# Patient Record
Sex: FEMALE | Race: WHITE | Employment: PART TIME | URBAN - METROPOLITAN AREA
[De-identification: names, ages, dates, MRNs, and addresses within clinical notes are randomized per-mention and may not be internally consistent; named-entity substitution may affect disease eponyms.]

---

## 2023-11-05 ENCOUNTER — OFFICE VISIT (OUTPATIENT)
Dept: FAMILY MEDICINE | Facility: CLINIC | Age: 58
End: 2023-11-05
Payer: COMMERCIAL

## 2023-11-05 VITALS
SYSTOLIC BLOOD PRESSURE: 132 MMHG | TEMPERATURE: 98.2 F | DIASTOLIC BLOOD PRESSURE: 80 MMHG | OXYGEN SATURATION: 97 % | RESPIRATION RATE: 16 BRPM | HEIGHT: 65 IN | HEART RATE: 67 BPM | WEIGHT: 125.5 LBS | BODY MASS INDEX: 20.91 KG/M2

## 2023-11-05 DIAGNOSIS — S61.213A LACERATION OF LEFT MIDDLE FINGER WITHOUT FOREIGN BODY WITHOUT DAMAGE TO NAIL, INITIAL ENCOUNTER: Primary | ICD-10-CM

## 2023-11-05 PROCEDURE — 12001 RPR S/N/AX/GEN/TRNK 2.5CM/<: CPT | Performed by: FAMILY MEDICINE

## 2023-11-05 PROCEDURE — 90471 IMMUNIZATION ADMIN: CPT | Performed by: FAMILY MEDICINE

## 2023-11-05 PROCEDURE — 90715 TDAP VACCINE 7 YRS/> IM: CPT | Performed by: FAMILY MEDICINE

## 2023-11-05 NOTE — PROGRESS NOTES
"Clinical Decision Making:    At the end of the encounter, I discussed results, diagnosis, medications. Discussed red flags for immediate return to clinic/ER, as well as indications for follow up if no improvement. Patient understood and agreed to plan. Patient was stable for discharge.      ICD-10-CM    1. Laceration of left middle finger without foreign body without damage to nail, initial encounter  S61.213A Primary Care Referral        Closed with Dermabond  Discussed not soaking, watching for signs of infection including redness, pain or drainage and following up if that occurs  Tetanus updated today  Printed patient education on lacerations closed with adhesive  Follow-up if not improving as anticipated  Placed order to help get her scheduled for primary care to establish care      There are no Patient Instructions on file for this visit.   Return if symptoms worsen or fail to improve.      chief complaint    HPI:  Ira Ortega is a 58 year old female who presents today complaining of cut on her left finger today.  She was in the kitchen putting a knife away back into its sleeve and she missed the sleeve.  She had a cut on her left third finger and was having a hard time getting the bleeding to stop.  She has no idea when her last tetanus vaccine was given  She just moved back here from [place]    History obtained from the patient.    Problem List:  There are no relevant problems documented for this patient.      History reviewed. No pertinent past medical history.    Social History     Tobacco Use    Smoking status: Never    Smokeless tobacco: Never   Substance Use Topics    Alcohol use: Not on file       Review of systems  negative except listed in HPI    Vitals:    11/05/23 1226   BP: 132/80   BP Location: Right arm   Patient Position: Sitting   Cuff Size: Adult Regular   Pulse: 67   Resp: 16   Temp: 98.2  F (36.8  C)   TempSrc: Oral   SpO2: 97%   Weight: 56.9 kg (125 lb 8 oz)   Height: 1.651 m (5' 5\") "       Physical Exam  Vitals noted and within normal limits  In general she is alert, oriented, and in no acute distress  Left hand with normal radial pulse and sensation intact distally to all of the fingers.  On the left third finger distally there is a horizontally oriented 1 cm laceration which is well approximated.  Initially it was done bleeding but when I checked to see the depth it started bleeding again.  No nail involvement.  The cut is on the palmar aspect distal to the DIP.  Irrigation with saline  Bleeding controlled with pressure and a temporary wrap tourniquet placed on the left arm which was then removed  Wound edges approximated  Tissue adhesive (Dermabond) used to close wound  Patient tolerated well